# Patient Record
Sex: MALE | Race: ASIAN | NOT HISPANIC OR LATINO | Employment: FULL TIME | URBAN - METROPOLITAN AREA
[De-identification: names, ages, dates, MRNs, and addresses within clinical notes are randomized per-mention and may not be internally consistent; named-entity substitution may affect disease eponyms.]

---

## 2024-06-06 ENCOUNTER — OFFICE VISIT (OUTPATIENT)
Dept: URGENT CARE | Age: 34
End: 2024-06-06
Payer: COMMERCIAL

## 2024-06-06 VITALS
HEART RATE: 73 BPM | RESPIRATION RATE: 18 BRPM | TEMPERATURE: 98 F | SYSTOLIC BLOOD PRESSURE: 98 MMHG | DIASTOLIC BLOOD PRESSURE: 60 MMHG | OXYGEN SATURATION: 97 %

## 2024-06-06 DIAGNOSIS — L03.211 CELLULITIS OF FACE: Primary | ICD-10-CM

## 2024-06-06 PROCEDURE — S9083 URGENT CARE CENTER GLOBAL: HCPCS

## 2024-06-06 PROCEDURE — G0383 LEV 4 HOSP TYPE B ED VISIT: HCPCS

## 2024-06-06 RX ORDER — CEPHALEXIN 500 MG/1
500 CAPSULE ORAL EVERY 12 HOURS SCHEDULED
Qty: 14 CAPSULE | Refills: 0 | Status: SHIPPED | OUTPATIENT
Start: 2024-06-06 | End: 2024-06-06

## 2024-06-06 RX ORDER — OLOPATADINE HYDROCHLORIDE 1 MG/ML
1 SOLUTION/ DROPS OPHTHALMIC 2 TIMES DAILY
Qty: 5 ML | Refills: 0 | Status: SHIPPED | OUTPATIENT
Start: 2024-06-06 | End: 2024-06-06

## 2024-06-06 RX ORDER — OLOPATADINE HYDROCHLORIDE 1 MG/ML
1 SOLUTION/ DROPS OPHTHALMIC 2 TIMES DAILY
Qty: 5 ML | Refills: 0 | Status: SHIPPED | OUTPATIENT
Start: 2024-06-06

## 2024-06-06 RX ORDER — CEPHALEXIN 500 MG/1
500 CAPSULE ORAL EVERY 8 HOURS SCHEDULED
Qty: 21 CAPSULE | Refills: 0 | Status: SHIPPED | OUTPATIENT
Start: 2024-06-06 | End: 2024-06-13

## 2024-06-06 RX ORDER — CEPHALEXIN 500 MG/1
500 CAPSULE ORAL EVERY 8 HOURS SCHEDULED
Qty: 21 CAPSULE | Refills: 0 | Status: SHIPPED | OUTPATIENT
Start: 2024-06-06 | End: 2024-06-06

## 2024-06-06 NOTE — PROGRESS NOTES
[facility]        NAME: Cecilia Felipe is a 34 y.o. male  : 1990    MRN: [id number]  DATE: 2024  TIME: 1:50 PM    Assessment and Plan   Cellulitis of face [L03.211]  1. Cellulitis of face  cephalexin (KEFLEX) 500 mg capsule    olopatadine (PATANOL) 0.1 % ophthalmic solution    DISCONTINUED: cephalexin (KEFLEX) 500 mg capsule        Visual acuity: uncorrected: right 20/20, left 20/20, both 20/20  Use eye drops for relief of eye irritation.   Monitor site for increased redness outside of the area, change in rash, increased swelling, or drainage.     If this develops or you develop any fever, chills, aches, joint pain, swelling, headache, dizziness, numbness or any new or concerning symptoms please proceed to ER.    Take antibiotics as prescribed.   Take entire course of antibiotics.     Eat yogurt with live and active cultures and/or take a probiotic at least 3 hours before or after antibiotic dose.   Monitor stool for diarrhea and/or blood. If this occurs, contact primary care doctor ASAP.     Patient Instructions       Follow up with PCP in 3-5 days.  Proceed to  ER if symptoms worsen.    If tests have been performed at [facility], our office will contact you with results if changes need to be made to the care plan discussed with you at the visit.  [facility]    Chief Complaint     Chief Complaint   Patient presents with   • Skin Irritation     On  lymph nodes were swollen with an possible skin infection on forehead. Patient states that he noticed a polyp in his mouth and on his eyelid. Experiencing bilateral eye itching.          History of Present Illness       Pt is 34 year old male presenting with rash to forehead for 3 days.  Has been taking motrin and using neosporin without relief.  He denies fever or chills, no sore throat, n/v/d.  He denies new products or exposure to anyone with same symptoms. He also reports itching to right eye  for 3 days.  Denies changes in vision, no drainage or redness to eye.         Review of Systems   Review of Systems   Skin:  Positive for rash and wound.         Current Medications       Current Outpatient Medications:   •  cephalexin (KEFLEX) 500 mg capsule, Take 1 capsule (500 mg total) by mouth every 8 (eight) hours for 7 days, Disp: 21 capsule, Rfl: 0  •  olopatadine (PATANOL) 0.1 % ophthalmic solution, Administer 1 drop to the right eye 2 (two) times a day, Disp: 5 mL, Rfl: 0    Current Allergies     Allergies as of 06/06/2024   • (No Known Allergies)            The following portions of the patient's history were reviewed and updated as appropriate: allergies, current medications, past family history, past medical history, past social history, past surgical history and problem list.     History reviewed. No pertinent past medical history.    History reviewed. No pertinent surgical history.    History reviewed. No pertinent family history.      Medications have been verified.        Objective   BP 98/60   Pulse 73   Temp 98 °F (36.7 °C)   Resp 18   SpO2 97%   No LMP for male patient.       Physical Exam     Physical Exam  Vitals and nursing note reviewed.   Constitutional:       General: He is not in acute distress.     Appearance: Normal appearance. He is normal weight. He is not ill-appearing.   HENT:      Right Ear: Tympanic membrane normal.      Left Ear: Tympanic membrane normal.      Nose: No congestion.      Mouth/Throat:      Mouth: Mucous membranes are moist.   Eyes:      General: Lids are normal. Lids are everted, no foreign bodies appreciated. Vision grossly intact. Gaze aligned appropriately. No visual field deficit.     Extraocular Movements: Extraocular movements intact.      Conjunctiva/sclera: Conjunctivae normal.      Right eye: Right conjunctiva is not injected. No chemosis, exudate or hemorrhage.     Left eye: Left conjunctiva is not injected. No chemosis, exudate or  hemorrhage.  Cardiovascular:      Rate and Rhythm: Normal rate and regular rhythm.      Pulses: Normal pulses.   Pulmonary:      Effort: Pulmonary effort is normal.      Breath sounds: Normal breath sounds.   Abdominal:      General: Abdomen is flat.      Palpations: Abdomen is soft.   Skin:     General: Skin is warm.      Findings: Erythema and lesion present.             Comments: Quarter sized area midline forehead hairline, open areas mild amount of drainage with surrounding redness, erythema.  Consistent with cellulitis.   Neurological:      Mental Status: He is alert.

## 2024-06-06 NOTE — PATIENT INSTRUCTIONS
Monitor site for increased redness outside of the area, change in rash, increased swelling, or drainage.     If this develops or you develop any fever, chills, aches, joint pain, swelling, headache, dizziness, numbness or any new or concerning symptoms please proceed to ER.    Take antibiotics as prescribed.   Take entire course of antibiotics.     Eat yogurt with live and active cultures and/or take a probiotic at least 3 hours before or after antibiotic dose.   Monitor stool for diarrhea and/or blood. If this occurs, contact primary care doctor ASAP.     Good hand hygiene  Avoid scratching area  Keep area clean and dry    Watch for signs of increased infection as previously discussed    If you develop any facial swelling, shortness of breath, difficulty breathing or any new or concerning symptoms please return or proceed ER.     Follow up with PCP in 3-5 days.

## 2024-08-23 ENCOUNTER — APPOINTMENT (OUTPATIENT)
Dept: RADIOLOGY | Age: 34
End: 2024-08-23
Payer: COMMERCIAL

## 2024-08-23 ENCOUNTER — OFFICE VISIT (OUTPATIENT)
Dept: URGENT CARE | Age: 34
End: 2024-08-23
Payer: COMMERCIAL

## 2024-08-23 VITALS
SYSTOLIC BLOOD PRESSURE: 111 MMHG | TEMPERATURE: 98.3 F | DIASTOLIC BLOOD PRESSURE: 71 MMHG | OXYGEN SATURATION: 98 % | RESPIRATION RATE: 18 BRPM | HEART RATE: 67 BPM

## 2024-08-23 DIAGNOSIS — R05.2 SUBACUTE COUGH: ICD-10-CM

## 2024-08-23 DIAGNOSIS — J20.8 ACUTE VIRAL BRONCHITIS: Primary | ICD-10-CM

## 2024-08-23 DIAGNOSIS — M94.0 COSTOCHONDRITIS, ACUTE: ICD-10-CM

## 2024-08-23 PROCEDURE — G0382 LEV 3 HOSP TYPE B ED VISIT: HCPCS | Performed by: PHYSICIAN ASSISTANT

## 2024-08-23 PROCEDURE — S9083 URGENT CARE CENTER GLOBAL: HCPCS | Performed by: PHYSICIAN ASSISTANT

## 2024-08-23 PROCEDURE — 71046 X-RAY EXAM CHEST 2 VIEWS: CPT

## 2024-08-23 RX ORDER — BENZONATATE 100 MG/1
100 CAPSULE ORAL 3 TIMES DAILY PRN
Qty: 20 CAPSULE | Refills: 0 | Status: SHIPPED | OUTPATIENT
Start: 2024-08-23

## 2024-08-23 RX ORDER — PREDNISONE 20 MG/1
40 TABLET ORAL DAILY
Qty: 10 TABLET | Refills: 0 | Status: SHIPPED | OUTPATIENT
Start: 2024-08-23 | End: 2024-08-28

## 2024-08-23 NOTE — PROGRESS NOTES
[facility]        NAME: Cecilia Felipe is a 34 y.o. male  : 1990    MRN: [id number]  DATE: 2024  TIME: 10:21 AM    Assessment and Plan   Acute viral bronchitis [J20.8]  1. Acute viral bronchitis  XR chest pa & lateral    predniSONE 20 mg tablet    benzonatate (TESSALON PERLES) 100 mg capsule      2. Costochondritis, acute        Presents with symptoms consistent with viral bronchitis and possible costochondritis.  Recommend chest x-ray to rule out fracture and pneumonia.  Chest x-ray clear with no acute fracture dislocations and no evidence of acute cardiopulmonary disease.  Patient will be treated with prednisone and Tessalon Perles.      Patient Instructions     Patient Instructions   Prednisone as prescribed.  Do not take with any NSAIDs (Advil, ibuprofen, Motrin, Aleve).  Tessalon Perles as needed for cough as prescribed.  Tylenol as needed for pain.  Note that cough with bronchitis can last for up to 8 weeks.  If chest pain symptoms do not improve over the next 2 to 3 days follow-up with PCP.  If symptoms worsen or new symptoms develop report to the emergency room immediately.      Follow up with PCP in 3-5 days.  Proceed to  ER if symptoms worsen.    If tests have been performed at [facility], our office will contact you with results if changes need to be made to the care plan discussed with you at the visit. [facility]     Chief Complaint     Chief Complaint   Patient presents with    Cough    Cold Like Symptoms     Patient had an URI the beg of July but the cough is still remaining. Pain with coughing.          History of Present Illness       34-year-old male presents with persistent dry cough.  Patient reports it is occasionally productive of white sputum.  Patient denies fever, chills but notes that he has developed chest pain over the past couple days.  Chest pain is located on the right side and is present only with cough and deep  breathing.  Patient denies shortness of breath.  He denies URI symptoms including runny nose, sinus congestion, and sore throat.  Patient reports that cough has been present since evening of July.    Cough  Associated symptoms include chest pain. Pertinent negatives include no chills, fever, postnasal drip, rhinorrhea, sore throat or shortness of breath.       Review of Systems   Review of Systems   Constitutional:  Negative for chills and fever.   HENT:  Negative for congestion, postnasal drip, rhinorrhea and sore throat.    Respiratory:  Positive for cough. Negative for shortness of breath.    Cardiovascular:  Positive for chest pain.         Current Medications       Current Outpatient Medications:     benzonatate (TESSALON PERLES) 100 mg capsule, Take 1 capsule (100 mg total) by mouth 3 (three) times a day as needed for cough, Disp: 20 capsule, Rfl: 0    predniSONE 20 mg tablet, Take 2 tablets (40 mg total) by mouth daily for 5 days, Disp: 10 tablet, Rfl: 0    olopatadine (PATANOL) 0.1 % ophthalmic solution, Administer 1 drop to the right eye 2 (two) times a day (Patient not taking: Reported on 8/23/2024), Disp: 5 mL, Rfl: 0    Current Allergies     Allergies as of 08/23/2024    (No Known Allergies)            The following portions of the patient's history were reviewed and updated as appropriate: allergies, current medications, past family history, past medical history, past social history, past surgical history and problem list.     No past medical history on file.    No past surgical history on file.    No family history on file.      Medications have been verified.        Objective   /71   Pulse 67   Temp 98.3 °F (36.8 °C)   Resp 18   SpO2 98%   No LMP for male patient.       Physical Exam     Physical Exam  Vitals and nursing note reviewed.   Constitutional:       General: He is awake. He is not in acute distress.     Appearance: Normal appearance. He is well-developed and well-groomed. He is not  "ill-appearing, toxic-appearing or diaphoretic.   HENT:      Head: Normocephalic and atraumatic.      Right Ear: Hearing and external ear normal.      Left Ear: Hearing and external ear normal.   Eyes:      General: Lids are normal. Vision grossly intact. Gaze aligned appropriately.   Cardiovascular:      Rate and Rhythm: Normal rate and regular rhythm.      Heart sounds: Normal heart sounds, S1 normal and S2 normal.   Pulmonary:      Effort: Pulmonary effort is normal.      Breath sounds: Normal breath sounds.      Comments: Patient is speaking in full sentences with no increased respiratory effort. No audible wheezing or stridor.   Chest:          Comments: Tenderness to palpation to the anterior chest wall below the right pectoralis muscle.  This reproduces the pain the patient is feeling  Musculoskeletal:      Cervical back: Normal range of motion.   Skin:     General: Skin is warm and dry.   Neurological:      Mental Status: He is alert and oriented to person, place, and time.      Coordination: Coordination is intact.      Gait: Gait is intact.   Psychiatric:         Attention and Perception: Attention and perception normal.         Mood and Affect: Mood and affect normal.         Speech: Speech normal.         Behavior: Behavior normal. Behavior is cooperative.               Note: Portions of this record may have been created with voice recognition software. Occasional wrong word or \"sound a like\" substitutions may have occurred due to the inherent limitations of voice recognition software. Please read the chart carefully and recognize, using context, where substitutions have occurred.*      "

## 2024-08-23 NOTE — PATIENT INSTRUCTIONS
Prednisone as prescribed.  Do not take with any NSAIDs (Advil, ibuprofen, Motrin, Aleve).  Tessalon Perles as needed for cough as prescribed.  Tylenol as needed for pain.  Note that cough with bronchitis can last for up to 8 weeks.  If chest pain symptoms do not improve over the next 2 to 3 days follow-up with PCP.  If symptoms worsen or new symptoms develop report to the emergency room immediately.